# Patient Record
Sex: MALE | Race: BLACK OR AFRICAN AMERICAN | NOT HISPANIC OR LATINO | URBAN - METROPOLITAN AREA
[De-identification: names, ages, dates, MRNs, and addresses within clinical notes are randomized per-mention and may not be internally consistent; named-entity substitution may affect disease eponyms.]

---

## 2025-03-01 ENCOUNTER — OFFICE VISIT (OUTPATIENT)
Dept: URGENT CARE | Age: 55
End: 2025-03-01
Payer: COMMERCIAL

## 2025-03-01 VITALS
HEIGHT: 66 IN | RESPIRATION RATE: 20 BRPM | BODY MASS INDEX: 25.71 KG/M2 | WEIGHT: 160 LBS | OXYGEN SATURATION: 97 % | HEART RATE: 91 BPM | SYSTOLIC BLOOD PRESSURE: 134 MMHG | DIASTOLIC BLOOD PRESSURE: 89 MMHG | TEMPERATURE: 97.6 F

## 2025-03-01 DIAGNOSIS — J10.1 INFLUENZA A: Primary | ICD-10-CM

## 2025-03-01 DIAGNOSIS — R05.9 COUGH, UNSPECIFIED TYPE: ICD-10-CM

## 2025-03-01 LAB
POC RAPID INFLUENZA A: POSITIVE
POC RAPID INFLUENZA B: NEGATIVE
POC SARS-COV-2 AG BINAX: NORMAL

## 2025-03-01 ASSESSMENT — ENCOUNTER SYMPTOMS
HEADACHES: 1
FATIGUE: 1
COUGH: 1

## 2025-03-01 NOTE — LETTER
March 1, 2025     Patient: Mitchell Johnston   YOB: 1970   Date of Visit: 3/1/2025       To Whom It May Concern:    Mitchell Johnston was seen in my clinic on 3/1/2025 at 9:15 am. Please excuse Mitchell for his absence from work on 27-28 February 2025.    If you have any questions or concerns, please don't hesitate to call.         Sincerely,         Darius Myers PA-C        CC: No Recipients

## 2025-03-01 NOTE — PROGRESS NOTES
"Subjective   Patient ID: Mitchell Johnston is a 55 y.o. male. They present today with a chief complaint of Headache, Generalized Body Aches, Fatigue, Cough, and Chills.    History of Present Illness  Patient is a 55-year-old male who complains of fever, chills, body aches and cough that he has been experiencing for the past 4 days.  Patient reports that he is not feeling significantly ill and arrives today only at the direction of his wife who is concerned for influenza.      Headache  Associated symptoms: cough and fatigue    Fatigue  Associated symptoms: cough, fatigue and headaches    Cough  Associated symptoms include headaches.     Past Medical History  Allergies as of 03/01/2025    (No Known Allergies)     (Not in a hospital admission)    No past medical history on file.    No past surgical history on file.    Review of Systems  Review of Systems   Constitutional:  Positive for fatigue.   Respiratory:  Positive for cough.    Neurological:  Positive for headaches.   All other systems reviewed and are negative.    Objective    Vitals:    03/01/25 0912   BP: 134/89   Pulse: 91   Resp: 20   Temp: 36.4 °C (97.6 °F)   SpO2: 97%   Weight: 72.6 kg (160 lb)   Height: 1.676 m (5' 6\")     No LMP for male patient.    Physical Exam  Vitals and nursing note reviewed.   Constitutional:       Appearance: Normal appearance. He is normal weight.   HENT:      Head: Normocephalic and atraumatic.      Right Ear: Tympanic membrane, ear canal and external ear normal.      Left Ear: Tympanic membrane, ear canal and external ear normal.      Nose: Nose normal. No congestion or rhinorrhea.      Mouth/Throat:      Mouth: Mucous membranes are moist.      Pharynx: Oropharynx is clear. No oropharyngeal exudate or posterior oropharyngeal erythema.   Eyes:      Extraocular Movements: Extraocular movements intact.      Conjunctiva/sclera: Conjunctivae normal.      Pupils: Pupils are equal, round, and reactive to light.   Cardiovascular:      Rate " and Rhythm: Normal rate and regular rhythm.      Pulses: Normal pulses.      Heart sounds: Normal heart sounds.   Pulmonary:      Effort: Pulmonary effort is normal. No respiratory distress.      Breath sounds: Normal breath sounds. No stridor. No wheezing, rhonchi or rales.   Musculoskeletal:      Cervical back: Normal range of motion and neck supple.   Skin:     General: Skin is warm and dry.      Capillary Refill: Capillary refill takes less than 2 seconds.   Neurological:      General: No focal deficit present.      Mental Status: He is alert and oriented to person, place, and time.   Psychiatric:         Mood and Affect: Mood normal.         Behavior: Behavior normal.         Thought Content: Thought content normal.         Judgment: Judgment normal.     Point of Care Test & Imaging Results from this visit  Results for orders placed or performed in visit on 03/01/25   POCT Covid-19 Rapid Antigen   Result Value Ref Range    POC WES-COV-2 AG  Presumptive negative test for SARS-CoV-2 (no antigen detected)     Presumptive negative test for SARS-CoV-2 (no antigen detected)   POCT Influenza A/B manually resulted   Result Value Ref Range    POC Rapid Influenza A Positive (A) Negative    POC Rapid Influenza B Negative Negative      No results found.    Diagnostic study results (if any) were reviewed by Darius Myers PA-C.    Assessment/Plan   Allergies, medications, history, and pertinent labs/EKGs/Imaging reviewed by Darius Myers PA-C.     Medical Decision Making  Unremarkable physical exam findings as noted above.  Rapid influenza test is positive for influenza type A.  Supportive care was discussed and the patient was provided with appropriate documentation for work.    CLINICAL IMPRESSION:  Influenza Type A    Orders and Diagnoses  Diagnoses and all orders for this visit:  Influenza A  Cough, unspecified type  -     POCT Covid-19 Rapid Antigen  -     POCT Influenza A/B manually resulted    Patient disposition:  Home    Electronically signed by Darius Myers PA-C  9:20 AM